# Patient Record
Sex: MALE | Race: WHITE | NOT HISPANIC OR LATINO | Employment: OTHER | URBAN - METROPOLITAN AREA
[De-identification: names, ages, dates, MRNs, and addresses within clinical notes are randomized per-mention and may not be internally consistent; named-entity substitution may affect disease eponyms.]

---

## 2020-08-25 ENCOUNTER — TRANSFERRED RECORDS (OUTPATIENT)
Dept: HEALTH INFORMATION MANAGEMENT | Facility: CLINIC | Age: 75
End: 2020-08-25

## 2021-10-05 ENCOUNTER — TRANSFERRED RECORDS (OUTPATIENT)
Dept: HEALTH INFORMATION MANAGEMENT | Facility: CLINIC | Age: 76
End: 2021-10-05

## 2022-07-25 ENCOUNTER — TRANSFERRED RECORDS (OUTPATIENT)
Dept: HEALTH INFORMATION MANAGEMENT | Facility: CLINIC | Age: 77
End: 2022-07-25

## 2022-08-04 ENCOUNTER — TRANSFERRED RECORDS (OUTPATIENT)
Dept: HEALTH INFORMATION MANAGEMENT | Facility: CLINIC | Age: 77
End: 2022-08-04

## 2022-08-10 ENCOUNTER — TRANSFERRED RECORDS (OUTPATIENT)
Dept: HEALTH INFORMATION MANAGEMENT | Facility: CLINIC | Age: 77
End: 2022-08-10

## 2022-08-25 ENCOUNTER — TRANSFERRED RECORDS (OUTPATIENT)
Dept: HEALTH INFORMATION MANAGEMENT | Facility: CLINIC | Age: 77
End: 2022-08-25

## 2022-09-22 ENCOUNTER — TRANSFERRED RECORDS (OUTPATIENT)
Dept: HEALTH INFORMATION MANAGEMENT | Facility: CLINIC | Age: 77
End: 2022-09-22

## 2022-09-27 ENCOUNTER — TRANSCRIBE ORDERS (OUTPATIENT)
Dept: OTHER | Age: 77
End: 2022-09-27

## 2022-09-27 DIAGNOSIS — I71.40 ABDOMINAL AORTIC ANEURYSM (H): Primary | ICD-10-CM

## 2022-09-28 ENCOUNTER — DOCUMENTATION ONLY (OUTPATIENT)
Dept: VASCULAR SURGERY | Facility: CLINIC | Age: 77
End: 2022-09-28

## 2022-09-28 NOTE — PROGRESS NOTES
Action 9.28.22 jm   Action Taken Per Inbasket request from [facility]-    Faxed records/imaging request to [facility] at [phone].     Action 10.4.22 MJ   Action Taken Received message requesting update. No images have been pushed to PACS. Sent request to [facility] at number listed above.      Action 10.6.22 jm   Action Taken Received records from [facility]. Sent to [facility]. Also sent to HIM for scanning into chart. No imaging received.

## 2022-10-19 ENCOUNTER — VIRTUAL VISIT (OUTPATIENT)
Dept: VASCULAR SURGERY | Facility: CLINIC | Age: 77
End: 2022-10-19
Payer: MEDICARE

## 2022-10-19 DIAGNOSIS — Z85.528 HISTORY OF KIDNEY CANCER: ICD-10-CM

## 2022-10-19 DIAGNOSIS — Z72.0 TOBACCO ABUSE: ICD-10-CM

## 2022-10-19 DIAGNOSIS — I25.10 CORONARY ARTERY DISEASE INVOLVING NATIVE CORONARY ARTERY OF NATIVE HEART WITHOUT ANGINA PECTORIS: ICD-10-CM

## 2022-10-19 DIAGNOSIS — I73.9 PERIPHERAL ARTERIAL DISEASE (H): ICD-10-CM

## 2022-10-19 DIAGNOSIS — I07.1 TRICUSPID VALVE INSUFFICIENCY, UNSPECIFIED ETIOLOGY: ICD-10-CM

## 2022-10-19 DIAGNOSIS — I48.20 CHRONIC ATRIAL FIBRILLATION (H): ICD-10-CM

## 2022-10-19 DIAGNOSIS — I25.5 ISCHEMIC CARDIOMYOPATHY: ICD-10-CM

## 2022-10-19 DIAGNOSIS — I71.43 INFRARENAL ABDOMINAL AORTIC ANEURYSM (AAA) WITHOUT RUPTURE (H): Primary | ICD-10-CM

## 2022-10-19 PROCEDURE — 99204 OFFICE O/P NEW MOD 45 MIN: CPT | Mod: 95 | Performed by: SURGERY

## 2022-10-19 RX ORDER — SPIRONOLACTONE 25 MG/1
TABLET ORAL
COMMUNITY
Start: 2022-08-04

## 2022-10-19 RX ORDER — BIOTIN 10 MG
1 TABLET ORAL
COMMUNITY

## 2022-10-19 RX ORDER — TORSEMIDE 10 MG/1
20 TABLET ORAL DAILY
COMMUNITY
Start: 2022-08-05

## 2022-10-19 RX ORDER — NITROGLYCERIN 0.4 MG/1
0.4 TABLET SUBLINGUAL
COMMUNITY
Start: 2022-08-04

## 2022-10-19 RX ORDER — ALBUTEROL SULFATE 90 UG/1
AEROSOL, METERED RESPIRATORY (INHALATION)
COMMUNITY
Start: 2022-02-05

## 2022-10-19 RX ORDER — TAMSULOSIN HYDROCHLORIDE 0.4 MG/1
CAPSULE ORAL
COMMUNITY
Start: 2022-08-04 | End: 2023-04-26

## 2022-10-19 RX ORDER — METOPROLOL SUCCINATE 100 MG/1
100 TABLET, EXTENDED RELEASE ORAL DAILY
COMMUNITY
Start: 2022-08-26

## 2022-10-19 RX ORDER — APIXABAN 5 MG/1
5 TABLET, FILM COATED ORAL 2 TIMES DAILY
COMMUNITY
Start: 2022-09-22

## 2022-10-19 RX ORDER — MECOBALAMIN 5000 MCG
5 TABLET,DISINTEGRATING ORAL
COMMUNITY

## 2022-10-19 RX ORDER — ROSUVASTATIN CALCIUM 20 MG/1
20 TABLET, COATED ORAL DAILY
COMMUNITY
Start: 2022-06-29

## 2022-10-19 RX ORDER — CHLORAL HYDRATE 500 MG
1000 CAPSULE ORAL
COMMUNITY

## 2022-10-19 RX ORDER — POTASSIUM CHLORIDE 1500 MG/1
20 TABLET, EXTENDED RELEASE ORAL
COMMUNITY

## 2022-10-19 RX ORDER — PANTOPRAZOLE SODIUM 40 MG/1
40 TABLET, DELAYED RELEASE ORAL DAILY
COMMUNITY
Start: 2022-08-05

## 2022-10-19 NOTE — NURSING NOTE
Chief Complaint   Patient presents with     Consult       Patient confirms medications and allergies are accurate via patients echeck in completion, and or denies any changes since last reviewed/verified.       Maria Luisa Romano, Virtual Facilitator

## 2022-10-19 NOTE — PROGRESS NOTES
Vascular Surgery Consultation Note     Patient:  Umer Amos   Date of birth 1945, Medical record number [id number]  Date of Visit:  10/19/2022  Consult Requester:No att. 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  I discussed with he and his son Carlos Alberto that his current risk of rupture at 5.5 cm is equal to or lower than his calculated risk of complication at proximately 7 to 8% given his significant comorbidities.  Unfortunately given the extensive calcifications, he would not be a percutaneous endovascular candidate and would require bilateral cutdowns and possible bilateral endarterectomies.  He is down to smoking 1 cigarette/day.  We spent a significant amount of time during the visit discussing that if the aneurysm grows to 6 or 6.5 cm, then the risk of rupture would outweigh his risk of complications from the surgery.  We also discussed that it is possible, given the slow growth of abdominal aortic aneurysms, that at his age of 77, he may not come to needing endovascular repair.  I have suggested that we repeat a CTA of his aorta in 6 months.  He will be able to get the CT scan locally, then we will have a visit.  He and his son were pleased with this and are interested in following up.  I asked him to reach out to their primary care physician to cancel the cardiac catheterization that was planned for this coming Monday, which they felt was only for planning for surgery.  We reviewed the signs and symptoms of rupture of abdominal aortic aneurysm.  If this did happen, we explained that patients usually do not  immediately such as they might with a heart attack.  We described feelings of dizziness, lightheadedness and lower abdominal pain that would be different from his typical back pain.  I encouraged him to reach out if  "any additional questions arise, otherwise I look forward to talking with him again in the spring.    Many thanks for involving me in the care of this very pleasant patient. Should any questions or concerns arise, please don't hesitate to contact me.    Warm Regards,    Mckenna Garvey MD, DFSVS, RPVI  Director, [facility] Vascular Services  Professor and Chief, Vascular and Endovascular Surgery  [facility]  Pager: 1. Send message or 10 digit call back number Securely via [employer] with the Vocera Web Console (learn more here)              2. Outside of [facility]? Call [phone]                    Umer is a 77 year old who is being evaluated via a billable telephone visit.      What phone number would you like to be contacted at? [phone]  How would you like to obtain your AVS? Mail a copy   Patient states is currently in the [facility]: No   - [place]. Son Carlos Alberto is with patient      Subjective   Umer is a 77 year old presenting for the following health issues:  Consult      HPI     Mr. Wynne is joined on a virtual telephone consultation today with his son Carlos Alberto regarding a 5.5 cm infrarenal abdominal arctic aneurysm.  He was just recently told of the diagnosis and is unaware of any family history of abdominal aortic aneurysm.  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  His dad passed away from old age as did his mother.  No history of stroke, amaurosis or TIA.  He has had some back issues previously.  Denies any new abdominal or back pain.  Denies rest pain or tissue loss.    Review of Systems   Constitutional, HEENT, cardiovascular, pulmonary, gi and gu systems are negative, except as otherwise noted.      Objective    Vitals - Patient Reported  Weight (Patient Reported): 86.2 kg (190 lb)  Height (Patient Reported): 172.7 cm (5' 8\")  BMI (Based on Pt Reported Ht/Wt): 28.89  Pain Score: No Pain (0)        Physical Exam   healthy, " alert and no distress  PSYCH: Alert and oriented times 3; coherent speech, normal   rate and volume, able to articulate logical thoughts, able   to abstract reason, no tangential thoughts, no hallucinations   or delusions  His affect is normal  RESP: No cough, no audible wheezing, able to talk in full sentences  Remainder of exam unable to be completed due to telephone visits    CT scan from August 2022 with approximately 5 mm cuts on the axial views shows approximately 5.5 cm infrarenal abdominal arctic aneurysm.  There is heavily calcification throughout his external iliac arteries in particular and heavy calcification of the common femoral arteries.  Given that this is not a CTA, it is unclear if he actually has occlusion of his right external iliac artery.        Phone call duration: 45 minutes

## 2022-10-19 NOTE — LETTER
10/19/2022       RE: Umer Amos  [address] [address]     Dear Colleague,    Thank you for referring your patient, Umer Amos, to the [facility] VASCULAR CLINIC [place] at [facility]. Please see a copy of my visit note below.      Vascular Surgery Consultation Note     Patient:  Umer Amos   Date of birth 1945, Medical record number [id number]  Date of Visit:  10/19/2022  Consult Requester:No att. 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  I discussed with he and his son Carlos Alberto that his current risk of rupture at 5.5 cm is equal to or lower than his calculated risk of complication at proximately 7 to 8% given his significant comorbidities.  Unfortunately given the extensive calcifications, he would not be a percutaneous endovascular candidate and would require bilateral cutdowns and possible bilateral endarterectomies.  He is down to smoking 1 cigarette/day.  We spent a significant amount of time during the visit discussing that if the aneurysm grows to 6 or 6.5 cm, then the risk of rupture would outweigh his risk of complications from the surgery.  We also discussed that it is possible, given the slow growth of abdominal aortic aneurysms, that at his age of 77, he may not come to needing endovascular repair.  I have suggested that we repeat a CTA of his aorta in 6 months.  He will be able to get the CT scan locally, then we will have a visit.  He and his son were pleased with this and are interested in following up.  I asked him to reach out to their primary care physician to cancel the cardiac catheterization that was planned for this coming Monday, which they felt was only for planning for surgery.  We reviewed the signs and symptoms of rupture of  "abdominal aortic aneurysm.  If this did happen, we explained that patients usually do not  immediately such as they might with a heart attack.  We described feelings of dizziness, lightheadedness and lower abdominal pain that would be different from his typical back pain.  I encouraged him to reach out if any additional questions arise, otherwise I look forward to talking with him again in the spring.    Many thanks for involving me in the care of this very pleasant patient. Should any questions or concerns arise, please don't hesitate to contact me.    Warm Regards,    Mckenna Garvey MD, DFSVS, RPVI  Director, [facility] Vascular Services  Professor and Chief, Vascular and Endovascular Surgery  [facility]  Pager: 1. Send message or 10 digit call back number Securely via [employer] with the Vocera Web Console (learn more here)              2. Outside of [facility]? Call [phone]        Catherine Owusu is a 77 year old presenting for the following health issues:  Consult      HPI     Mr. Wynne is joined on a virtual telephone consultation today with his son Carlos Alberto regarding a 5.5 cm infrarenal abdominal arctic aneurysm.  He was just recently told of the diagnosis and is unaware of any family history of abdominal aortic aneurysm.  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  His dad passed away from old age as did his mother.  No history of stroke, amaurosis or TIA.  He has had some back issues previously.  Denies any new abdominal or back pain.  Denies rest pain or tissue loss.    Review of Systems   Constitutional, HEENT, cardiovascular, pulmonary, gi and gu systems are negative, except as otherwise noted.     Objective    Vitals - Patient Reported  Weight (Patient Reported): 86.2 kg (190 lb)  Height (Patient Reported): 172.7 cm (5' 8\")  BMI (Based on Pt Reported Ht/Wt): 28.89  Pain Score: No Pain (0)        Physical Exam   healthy, " alert and no distress  PSYCH: Alert and oriented times 3; coherent speech, normal   rate and volume, able to articulate logical thoughts, able   to abstract reason, no tangential thoughts, no hallucinations   or delusions  His affect is normal  RESP: No cough, no audible wheezing, able to talk in full sentences  Remainder of exam unable to be completed due to telephone visits    CT scan from August 2022 with approximately 5 mm cuts on the axial views shows approximately 5.5 cm infrarenal abdominal arctic aneurysm.  There is heavily calcification throughout his external iliac arteries in particular and heavy calcification of the common femoral arteries.  Given that this is not a CTA, it is unclear if he actually has occlusion of his right external iliac artery.       Phone call duration: 45 minutes        Sincerely,    Mckenna Garvey MD

## 2022-10-24 DIAGNOSIS — I71.43 INFRARENAL ABDOMINAL AORTIC ANEURYSM (AAA) WITHOUT RUPTURE (H): Primary | ICD-10-CM

## 2023-01-20 ENCOUNTER — TELEPHONE (OUTPATIENT)
Dept: VASCULAR SURGERY | Facility: CLINIC | Age: 78
End: 2023-01-20
Payer: MEDICARE

## 2023-01-20 NOTE — TELEPHONE ENCOUNTER
I will post pone this message out one week and call the pt to schedule for April with .  Wade Mcclain on 1/20/2023 at 11:20 AM

## 2023-01-20 NOTE — TELEPHONE ENCOUNTER
Called and spoke with Pt. Discussed that order for CTA with 6 month follow up was faxed to [facility]; however, they responded and noted they will not call Pt's to schedule.  Provided Pt with scheduling telephone they provided ([phone]). Pt agreed to call and schedule imaging for early-med April 2023. Vascular  will contact Pt in about 1 week once imaging can be coordinated to confirm a date for follow up visit. Pt verbalized understanding, agreed to current plan and denied any further questions.    Ellen Tobin LPN

## 2023-01-30 NOTE — TELEPHONE ENCOUNTER
Unable to leave a voicemail pt has no voice mail set up nor does he have [employer] will call pt again on 2/1.  Wade Mcclain on 1/30/2023 at 1:16 PM

## 2023-02-02 NOTE — TELEPHONE ENCOUNTER
Unable to leave a voicemail pt has no voice mail set up nor does he have Mychart will call pt again on 2/6 if no response I will send a letter.  Wade Mcclain on 2/2/2023 at 11:36 AM

## 2023-02-09 NOTE — TELEPHONE ENCOUNTER
I was able to get a hold of the pt he and his son Carlos Alberto were on the line I called and scheduled the pt for imaging at [facility] on 4/19 at 10:00 am. The pt is scheduled on 4/26 for a video visit with Diego Mcclain on 2/9/2023 at 12:16 PM     The pt was on the phone during scheduling of the above appointments and agreed to the dates times and locations of the appointments.

## 2023-04-19 ENCOUNTER — TELEPHONE (OUTPATIENT)
Dept: VASCULAR SURGERY | Facility: CLINIC | Age: 78
End: 2023-04-19
Payer: MEDICARE

## 2023-04-19 ENCOUNTER — TRANSFERRED RECORDS (OUTPATIENT)
Dept: HEALTH INFORMATION MANAGEMENT | Facility: CLINIC | Age: 78
End: 2023-04-19
Payer: MEDICARE

## 2023-04-19 NOTE — TELEPHONE ENCOUNTER
[facility] Call Center    Phone Message    May a detailed message be left on voicemail: no     Reason for Call: Other: Per Nurse at [facility], Pt is supposed to have a CTA chest scan but they do not have the orders. Pt has appointment today that will probably have to be reschduled. The fax number to sent orders to is [phone]     Action Taken: Other: Radiology    Travel Screening: Not Applicable

## 2023-04-25 NOTE — TELEPHONE ENCOUNTER
LVM for [facility] radiology dept requesting CT imaging.    JAY DixonN, RN  [facility] - P Vascular Surgery  Ph: [phone]  Fax: [phone]

## 2023-04-26 ENCOUNTER — VIRTUAL VISIT (OUTPATIENT)
Dept: VASCULAR SURGERY | Facility: CLINIC | Age: 78
End: 2023-04-26
Payer: MEDICARE

## 2023-04-26 DIAGNOSIS — I48.20 CHRONIC ATRIAL FIBRILLATION (H): ICD-10-CM

## 2023-04-26 DIAGNOSIS — I71.43 INFRARENAL ABDOMINAL AORTIC ANEURYSM (AAA) WITHOUT RUPTURE (H): Primary | ICD-10-CM

## 2023-04-26 DIAGNOSIS — Z72.0 TOBACCO ABUSE: ICD-10-CM

## 2023-04-26 PROCEDURE — 99214 OFFICE O/P EST MOD 30 MIN: CPT | Mod: 95 | Performed by: SURGERY

## 2023-04-26 NOTE — LETTER
4/26/2023       RE: Umer Amos  [address] [address]       Dear Colleague,    Thank you for referring your patient, Umer Amos, to the [facility] VASCULAR CLINIC [place] at [facility]. Please see a copy of my visit note below.            Vascular Surgery Consultation Note     Patient:  Umer Amos   Date of birth 1945, Medical record number [id number]  Date of Visit:  04/26/2023  Consult Requester:No att. 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 He would need bilateral femoral endarterectomies in addition to the endovascular aortic aneurysm repair. At this time, we all agreed that his risk of complications is higher than his risk of ruptures given his extensive PAD, smoking and severe CAD. As such we have agreed that endovascular AAA repair will be deferred until his AAA is closer to 6cm at which time his risk of complications will be on par or less than his risk of rupture. He and his daughter, Josefina, are in agreement and had an opportunity to ask questions. We will reconnect in 6 months with a repeat CTA.    Many thanks for involving me in the care of this very pleasant patient. Should any questions or concerns arise, please don't hesitate to contact me.    Warm Regards,    Mckenna Garvey MD, DFSVS, RPVI  Director, [facility] Vascular Services  Professor and Chief, Vascular and Endovascular Surgery  [facility]  Pager: 1. Send message or 10 digit call back number Securely via [employer] with the Vocera Web Console (learn more here)              2. Outside of [facility]? Call [phone]        45 minutes spent by me on the date of the encounter doing chart review, review of outside records, review of test results, interpretation of tests,  patient visit and documentation               HPI: Feeling OK. No new health issues since we last met in 2022. Does not use supplemental oxygen. Does some tinkering in his wood shop during the day. Denies any new back or abdominal pain.    Review of Systems   Constitutional, HEENT, cardiovascular, pulmonary, gi and gu systems are negative, except as otherwise noted.      Physical Exam   healthy, alert and no distress  PSYCH: Alert and oriented times 3; coherent speech, normal   rate and volume, able to articulate logical thoughts, able   to abstract reason, no tangential thoughts, no hallucinations   or delusions  His affect is normal  RESP: No cough, no audible wheezing, able to talk in full sentences  Remainder of exam unable to be completed due to telephone visits    Imagin.5cm MALIHA Owusu is a 78 year old who is being evaluated via a billable telephone visit.      What phone number would you like to be contacted at? [phone] Grzegorz Rocha  How would you like to obtain your AVS? Mail a copy  Distant Location (provider location):  Off-site         Phone call duration: 20 minutes    [phone]      Again, thank you for allowing me to participate in the care of your patient.      Sincerely,    Mckenna Garvey MD

## 2023-04-26 NOTE — PROGRESS NOTES
Vascular Surgery Consultation Note     Patient:  Umer Amos   Date of birth 1945, Medical record number [id number]  Date of Visit:  04/26/2023  Consult Requester:No att. providers found            Assessment and Recommendations:   ASSESSMENT / RECOMMENDATION:    79 y/o male with significant CAD and PAD with 5.5cm AAA with extensive iliac and femoral artery occlusive disease. He would need bilateral femoral endarterectomies in addition to the endovascular aortic aneurysm repair. At this time, we all agreed that his risk of complications is higher than his risk of ruptures given his extensive PAD, smoking and severe CAD. As such we have agreed that endovascular AAA repair will be deferred until his AAA is closer to 6cm at which time his risk of complications will be on par or less than his risk of rupture. He and his daughter, Josefina, are in agreement and had an opportunity to ask questions. We will reconnect in 6 months with a repeat CTA.    Many thanks for involving me in the care of this very pleasant patient. Should any questions or concerns arise, please don't hesitate to contact me.    Warm Regards,    Mckenna Garvey MD, DFSVS, RPVI  Director, [facility] Vascular Services  Professor and Chief, Vascular and Endovascular Surgery  [facility]  Pager: 1. Send message or 10 digit call back number Securely via [employer] with the Flipboard Web Console (learn more here)              2. Outside of [facility]? Call [phone]        45 minutes spent by me on the date of the encounter doing chart review, review of outside records, review of test results, interpretation of tests, patient visit and documentation               HPI: Feeling OK. No new health issues since we last met in October 2022. Does not use supplemental oxygen. Does some tinkering in his wood shop during the day. Denies any new back or abdominal pain.    Review of Systems   Constitutional, HEENT, cardiovascular,  pulmonary, gi and gu systems are negative, except as otherwise noted.      Physical Exam   healthy, alert and no distress  PSYCH: Alert and oriented times 3; coherent speech, normal   rate and volume, able to articulate logical thoughts, able   to abstract reason, no tangential thoughts, no hallucinations   or delusions  His affect is normal  RESP: No cough, no audible wheezing, able to talk in full sentences  Remainder of exam unable to be completed due to telephone visits      Imagin.5cm AAA    Phone Start Time: 400  Phone End Time: 420        Umer is a 78 year old who is being evaluated via a billable telephone visit.      What phone number would you like to be contacted at? [phone] Daughter Josefina  How would you like to obtain your AVS? Mail a copy  Distant Location (provider location):  Off-site          Phone call duration: 20 minutes    [phone]

## 2023-04-26 NOTE — NURSING NOTE
Patient confirms medications and allergies are accurate via patients echeck in completion, and or denies any changes since last reviewed/verified.     Is the patient currently in the state of [place]? NO [place]    Visit mode:VIDEO    If the visit is dropped, the patient can be reconnected by: VIDEO VISIT: Text to cell phone: [phone]    Will anyone else be joining the visit? Yes vasquez Carcamo      How would you like to obtain your AVS? Mail a copy    Are changes needed to the allergy or medication list? NO    Reason for visit: Follow Up            Maria Luisa Romano, [place] Facilitator

## 2023-04-27 NOTE — PATIENT INSTRUCTIONS
Thank you so much for choosing us for your care. It was a pleasure to see you at the vascular clinic today.     Follow-up recommendations: We will see you back in 6 months. Please do not hesitate to reach out to us in the meantime with any concerns. Our schedulers will get in touch with you to set up your follow-up visit.    Additional testing/imaging ordered today: Repeat CTA in 6 months.      Our scheduling team will get in touch with you to set up any follow-up testing/imaging and/or appointments. Please be aware that any testing/imaging recommended today will need to completed prior to your next visit with the provider. If testing/imaging is not completed prior to your next visit, your visit may be rescheduled.        If you have any questions, please contact our clinic directly at [phone] and ask for the nurse. We also encourage the use of [employer] ([employer]viously [employer]) to communicate with your HealthCare Provider.      If you have an urgent need after business hours (8:00 am to 4:30 pm) please call [phone], option 4, and ask for the vascular attending on call. For non-urgent after hours needs, please call the vascular clinic at [phone]. For scheduling needs, please call our clinic directly at [phone].